# Patient Record
Sex: FEMALE | Race: WHITE | NOT HISPANIC OR LATINO | URBAN - METROPOLITAN AREA
[De-identification: names, ages, dates, MRNs, and addresses within clinical notes are randomized per-mention and may not be internally consistent; named-entity substitution may affect disease eponyms.]

---

## 2017-05-15 ENCOUNTER — COMMUNICATION - HEALTHEAST (OUTPATIENT)
Dept: FAMILY MEDICINE | Facility: CLINIC | Age: 37
End: 2017-05-15

## 2017-05-15 DIAGNOSIS — E03.9 HYPOTHYROIDISM: ICD-10-CM

## 2017-06-13 ENCOUNTER — COMMUNICATION - HEALTHEAST (OUTPATIENT)
Dept: TELEHEALTH | Facility: CLINIC | Age: 37
End: 2017-06-13

## 2017-06-13 ENCOUNTER — OFFICE VISIT - HEALTHEAST (OUTPATIENT)
Dept: FAMILY MEDICINE | Facility: CLINIC | Age: 37
End: 2017-06-13

## 2017-06-13 DIAGNOSIS — Z76.89 ENCOUNTER TO ESTABLISH CARE: ICD-10-CM

## 2017-06-13 DIAGNOSIS — F41.1 GAD (GENERALIZED ANXIETY DISORDER): ICD-10-CM

## 2017-06-13 DIAGNOSIS — E03.9 HYPOTHYROIDISM: ICD-10-CM

## 2017-06-13 ASSESSMENT — MIFFLIN-ST. JEOR: SCORE: 1432.03

## 2017-06-14 ENCOUNTER — COMMUNICATION - HEALTHEAST (OUTPATIENT)
Dept: FAMILY MEDICINE | Facility: CLINIC | Age: 37
End: 2017-06-14

## 2017-07-03 ENCOUNTER — OFFICE VISIT - HEALTHEAST (OUTPATIENT)
Dept: FAMILY MEDICINE | Facility: CLINIC | Age: 37
End: 2017-07-03

## 2017-07-03 DIAGNOSIS — F41.1 GAD (GENERALIZED ANXIETY DISORDER): ICD-10-CM

## 2017-07-03 ASSESSMENT — MIFFLIN-ST. JEOR: SCORE: 1418.42

## 2017-07-28 ENCOUNTER — COMMUNICATION - HEALTHEAST (OUTPATIENT)
Dept: FAMILY MEDICINE | Facility: CLINIC | Age: 37
End: 2017-07-28

## 2017-07-28 DIAGNOSIS — E03.9 HYPOTHYROIDISM: ICD-10-CM

## 2017-08-19 ENCOUNTER — HEALTH MAINTENANCE LETTER (OUTPATIENT)
Age: 37
End: 2017-08-19

## 2019-11-06 ENCOUNTER — HEALTH MAINTENANCE LETTER (OUTPATIENT)
Age: 39
End: 2019-11-06

## 2020-11-29 ENCOUNTER — HEALTH MAINTENANCE LETTER (OUTPATIENT)
Age: 40
End: 2020-11-29

## 2021-05-27 ENCOUNTER — RECORDS - HEALTHEAST (OUTPATIENT)
Dept: ADMINISTRATIVE | Facility: CLINIC | Age: 41
End: 2021-05-27

## 2021-05-31 VITALS — WEIGHT: 164.9 LBS | BODY MASS INDEX: 27.47 KG/M2 | HEIGHT: 65 IN

## 2021-05-31 VITALS — WEIGHT: 161.9 LBS | BODY MASS INDEX: 26.98 KG/M2 | HEIGHT: 65 IN

## 2021-06-11 NOTE — PROGRESS NOTES
ASSESSMENT:  1. MITZY (generalized anxiety disorder)  Positive changes as been noted since patient start taking trazodone at bedtime.  She is taking 50 mg at bedtime.  Patient reported that she has been having issues staying awake in the morning.  And she has not been falling asleep right away after taking the medication.  I suggested to patient to take medication 2 hours before bedtime.  She goes to bed at 830 I suggested that she take her medication at 8 PM.  Patient will follow-up in 2 weeks  - venlafaxine (EFFEXOR XR) 75 MG 24 hr capsule; Take 3 capsules (225 mg total) by mouth daily.  Dispense: 90 capsule; Refill: 0  - traZODone (DESYREL) 50 MG tablet; Take 0.5 tablets (25 mg total) by mouth at bedtime.  Dispense: 90 tablet; Refill: 1    PLAN:  Patient Instructions   Take trazodone 50 mg 2 hours before bedtime.  Follow-up 4 weeks.      No orders of the defined types were placed in this encounter.    Medications Discontinued During This Encounter   Medication Reason     venlafaxine (EFFEXOR XR) 75 MG 24 hr capsule Reorder     traZODone (DESYREL) 50 MG tablet Reorder       No Follow-up on file.    CHIEF COMPLAINT:  Chief Complaint   Patient presents with     Follow-up     little better       HISTORY OF PRESENT ILLNESS:  Joyce is a 36 y.o. female presenting to the clinic today to follow-up regarding anxiety.    Anxiety/Depression: She has been taking 50 mg of trazodone nightly. She continues to take one to two hours to fall asleep, but is sleeping more deeply. She will still wake up at night and continues to feel tired in the morning, with difficulty waking up. She is waking up less at night. She is feeling tired today and got about 9 hours of sleep, and waking up once. She does drink coffee, one cup per day.    She states that her anxiety continues to be high, but she is doing better. She states that her breathing continues to be weird. She is taking 25 mg of hydroxyzine three times per day and 75 mg of  "venlafaxine three times per day. She is feeling some irritability today.    REVIEW OF SYSTEMS:   She develops a rapid heartbeat with multiple cups of coffee. She has been exercising regularly and would like to lose weight. All other systems are negative.    PFSH:  She rides horses several times per week.    Allergies   Allergen Reactions     Penicillins        TOBACCO USE:  History   Smoking Status     Never Smoker   Smokeless Tobacco     Not on file       VITALS:  Vitals:    07/03/17 1010   BP: 130/70   Pulse: 80   SpO2: 98%   Weight: 161 lb 14.4 oz (73.4 kg)   Height: 5' 5.2\" (1.656 m)     Wt Readings from Last 3 Encounters:   07/03/17 161 lb 14.4 oz (73.4 kg)   06/13/17 164 lb 14.4 oz (74.8 kg)   10/03/16 165 lb 12.8 oz (75.2 kg)     Body mass index is 26.78 kg/(m^2).    PHYSICAL EXAM:  Physical Examination: General appearance - alert, well appearing, and in no distress  Eyes: pupils equal and reactive, extraocular eye movements intact  Mouth: mucous membranes moist, pharynx normal without lesions  Neck: supple, no significant adenopathy  Lymphatics: no palpable lymphadenopathy  Chest: clear to auscultation, no wheezes, rales or rhonchi, symmetric air entry  Heart: normal rate, regular rhythm, normal S1, S2, no murmurs, rubs, clicks or gallops  Abdomen: soft, nontender, nondistended, no masses or organomegaly  Neurological: alert, oriented, normal speech, no focal findings or movement disorder noted  Extremities: No edema, no clubbing or cyanosis  Psychiatric: Normal affect. Does not appear anxious or depressed.    QUALITY MEASURES:  The following are part of a depression follow up plan for the patient:  mental health care assessment  The following high BMI interventions were performed this visit: encouragement to exercise and weight loss from baseline weight    ADDITIONAL HISTORY SUMMARIZED (2): None.  DECISION TO OBTAIN EXTRA INFORMATION (1): None.   RADIOLOGY TESTS (1): None.  LABS (1): None.  MEDICINE TESTS " (1): None.  INDEPENDENT REVIEW (2 each): None.     The visit lasted a total of 18 minutes face to face with the patient. Over 50% of the time was spent counseling and educating the patient about anxiety.    I, Preston Bach, am scribing for and in the presence of, Promise Amajiri.    I, Julia Estrada, personally performed the services described in this documentation, as scribed by Preston Bach in my presence, and it is both accurate and complete.    MEDICATIONS:  Current Outpatient Prescriptions   Medication Sig Dispense Refill     hydrOXYzine (VISTARIL) 25 MG capsule Take 1-2 capsules (25-50 mg total) by mouth 3 (three) times a day as needed for anxiety. 60 capsule 2     levothyroxine (SYNTHROID) 150 MCG tablet Take 1 tablet (150 mcg total) by mouth daily. 30 tablet 0     loratadine (CLARITIN) 10 mg tablet Take 10 mg by mouth daily.        traZODone (DESYREL) 50 MG tablet Take 0.5 tablets (25 mg total) by mouth at bedtime. 90 tablet 1     venlafaxine (EFFEXOR XR) 75 MG 24 hr capsule Take 3 capsules (225 mg total) by mouth daily. 90 capsule 0     cholecalciferol, vitamin D3, (VITAMIN D3) 1,000 unit capsule Take 4,000 Units by mouth daily.       diphenhydrAMINE (BENADRYL) 25 mg tablet Take by mouth.       multivitamin capsule Take by mouth.       No current facility-administered medications for this visit.        Total data points:0

## 2021-06-11 NOTE — PROGRESS NOTES
ASSESSMENT:  1. Encounter to establish care  2. Hypothyroidism  We plan to recheck thyroid today and re-dose levothyroxine if needed.  - levothyroxine (SYNTHROID) 150 MCG tablet; Take 1 tablet (150 mcg total) by mouth daily.  Dispense: 30 tablet; Refill: 0  - Thyroid Cascade    3. MITZY (generalized anxiety disorder)  We agreed on a trial of trazodone half tablet at bedtime for anxiety and for sleep.  Follow-up in 2 weeks  - venlafaxine (EFFEXOR XR) 75 MG 24 hr capsule; Take 3 capsules (225 mg total) by mouth daily.  Dispense: 90 capsule; Refill: 0  - traZODone (DESYREL) 50 MG tablet; Take 0.5 tablets (25 mg total) by mouth at bedtime.  Dispense: 15 tablet; Refill: 0    PLAN:  Patient Instructions   Continue to use Effexor 225 mg daily.  Start taking trazodone 25 mg at bedtime.  Follow-up in 2 weeks for reevaluation of anxiety and insomnia      Orders Placed This Encounter   Procedures     Thyroid Cascade     Medications Discontinued During This Encounter   Medication Reason     venlafaxine (EFFEXOR XR) 75 MG 24 hr capsule Reorder     levothyroxine (SYNTHROID) 150 MCG tablet Reorder       No Follow-up on file.    CHIEF COMPLAINT:  Chief Complaint   Patient presents with     Establish Care     Medication Refill       HISTORY OF PRESENT ILLNESS:  Joyce is a 36 y.o. female with history of hypothyroidism, major depression, and generalized anxiety disorder.  Presenting to the clinic today to establish care and to have her medications refilled.  Patient reported that she and her  are trying to have a child.  She reported that they have been trying for about 3 years.  They have been  for 3 years but have known each other for 11 years.  Patient reported that the test did show that her  has low sperm count.  And they are working on improving his sperm count.  Patient reported that her anxiety is worsening.  She reported that she continues to have shortness of breath without activity.  She reported that  "she is always feeling nervous and anxious and on the edge most of the time and that she has trouble relaxing.  She said that sometimes she become easily annoyed or irritable.  Patient reported that she is currently taking Effexor extended release 225 mg daily.  She reported that her depression is well controlled that she is not depressed in any way but she has trouble staying asleep.  She reported that she feels that her brain is very active when she is sleeping and she feels like sleeping most of the time.  She denied any thoughts of hurting herself or anybody around    REVIEW OF SYSTEMS:   All other systems are negative.    TOBACCO USE:  History   Smoking Status     Never Smoker   Smokeless Tobacco     Not on file       VITALS:  Vitals:    06/13/17 1133   BP: 118/72   Pulse: 72   SpO2: 98%   Weight: 164 lb 14.4 oz (74.8 kg)   Height: 5' 5.2\" (1.656 m)     Wt Readings from Last 3 Encounters:   06/13/17 164 lb 14.4 oz (74.8 kg)   10/03/16 165 lb 12.8 oz (75.2 kg)   05/13/16 168 lb 8 oz (76.4 kg)     Body mass index is 27.27 kg/(m^2).    PHYSICAL EXAM:  Constitutional:  Reveals blood pressure, heart rate, and weight:  Per nursing notes.  Ears:  Clear.  Oropharynx:  Without posterior nasal drainage or thrush.  Neck:  Supple, thyroid not palpable.  Cardiac:  Regular rate and rhythm without murmurs, rubs, or gallops. Carotids without bruits. Legs without edema. Palpation of the radial pulse regular.  Lungs: Clear.  Respiratory effort normal.  Abdomen:   Bowel sounds positive, nontender, nondistended.  Neither liver nor spleen palpable.  Skin:   Without rash, bruise, or palpable lesions.  Rheumatologic: Normal joints and nails of the hands.  Neurologic:  Cranial nerves II-XII intact.     Psychiatric:  Mood appropriate, memory intact.     QUALITY MEASURES:  The following high BMI interventions were performed this visit: encouragement to exercise and dietary management education, guidance, and counseling    ADDITIONAL " HISTORY SUMMARIZED (2): None.  DECISION TO OBTAIN EXTRA INFORMATION (1): None.   RADIOLOGY TESTS (1): None.  LABS (1): 1.  MEDICINE TESTS (1): None.  INDEPENDENT REVIEW (2 each): 2.     MEDICATIONS:  Current Outpatient Prescriptions   Medication Sig Dispense Refill     cholecalciferol, vitamin D3, (VITAMIN D3) 1,000 unit capsule Take 4,000 Units by mouth daily.       diphenhydrAMINE (BENADRYL) 25 mg tablet Take by mouth.       hydrOXYzine (VISTARIL) 25 MG capsule Take 1-2 capsules (25-50 mg total) by mouth 3 (three) times a day as needed for anxiety. 60 capsule 2     levothyroxine (SYNTHROID) 150 MCG tablet Take 1 tablet (150 mcg total) by mouth daily. 30 tablet 0     loratadine (CLARITIN) 10 mg tablet Take 10 mg by mouth daily.        venlafaxine (EFFEXOR XR) 75 MG 24 hr capsule Take 3 capsules (225 mg total) by mouth daily. 90 capsule 0     multivitamin capsule Take by mouth.       traZODone (DESYREL) 50 MG tablet Take 0.5 tablets (25 mg total) by mouth at bedtime. 15 tablet 0     No current facility-administered medications for this visit.        Total data points:2    Time: total time spent with the patient was 45 minutes of which >50% was spent in counseling and coordination of care

## 2021-09-19 ENCOUNTER — HEALTH MAINTENANCE LETTER (OUTPATIENT)
Age: 41
End: 2021-09-19

## 2022-01-09 ENCOUNTER — HEALTH MAINTENANCE LETTER (OUTPATIENT)
Age: 42
End: 2022-01-09

## 2022-11-21 ENCOUNTER — HEALTH MAINTENANCE LETTER (OUTPATIENT)
Age: 42
End: 2022-11-21

## 2023-04-16 ENCOUNTER — HEALTH MAINTENANCE LETTER (OUTPATIENT)
Age: 43
End: 2023-04-16

## 2024-02-04 ENCOUNTER — HEALTH MAINTENANCE LETTER (OUTPATIENT)
Age: 44
End: 2024-02-04